# Patient Record
Sex: FEMALE | Race: WHITE | NOT HISPANIC OR LATINO | RURAL
[De-identification: names, ages, dates, MRNs, and addresses within clinical notes are randomized per-mention and may not be internally consistent; named-entity substitution may affect disease eponyms.]

---

## 2023-12-29 ENCOUNTER — OFFICE VISIT (OUTPATIENT)
Dept: FAMILY MEDICINE | Facility: CLINIC | Age: 43
End: 2023-12-29
Payer: COMMERCIAL

## 2023-12-29 VITALS
HEART RATE: 104 BPM | TEMPERATURE: 98 F | RESPIRATION RATE: 18 BRPM | DIASTOLIC BLOOD PRESSURE: 100 MMHG | HEIGHT: 67 IN | SYSTOLIC BLOOD PRESSURE: 142 MMHG | OXYGEN SATURATION: 97 % | BODY MASS INDEX: 45.2 KG/M2 | WEIGHT: 288 LBS

## 2023-12-29 DIAGNOSIS — J10.1 INFLUENZA B: ICD-10-CM

## 2023-12-29 DIAGNOSIS — Z20.828 EXPOSURE TO VIRAL DISEASE: Primary | ICD-10-CM

## 2023-12-29 LAB
CTP QC/QA: YES
CTP QC/QA: YES
FLUAV AG NPH QL: NEGATIVE
FLUBV AG NPH QL: POSITIVE
S PYO RRNA THROAT QL PROBE: NEGATIVE
SARS-COV-2 AG RESP QL IA.RAPID: NEGATIVE

## 2023-12-29 PROCEDURE — 99203 PR OFFICE/OUTPT VISIT, NEW, LEVL III, 30-44 MIN: ICD-10-PCS | Mod: ,,, | Performed by: FAMILY MEDICINE

## 2023-12-29 PROCEDURE — 87880 POCT RAPID STREP A: ICD-10-PCS | Mod: QW,,, | Performed by: FAMILY MEDICINE

## 2023-12-29 PROCEDURE — 87428 SARSCOV & INF VIR A&B AG IA: CPT | Mod: QW,,, | Performed by: FAMILY MEDICINE

## 2023-12-29 PROCEDURE — 99203 OFFICE O/P NEW LOW 30 MIN: CPT | Mod: ,,, | Performed by: FAMILY MEDICINE

## 2023-12-29 PROCEDURE — 87428 POCT SARS-COV2 (COVID) WITH FLU ANTIGEN: ICD-10-PCS | Mod: QW,,, | Performed by: FAMILY MEDICINE

## 2023-12-29 PROCEDURE — 87880 STREP A ASSAY W/OPTIC: CPT | Mod: QW,,, | Performed by: FAMILY MEDICINE

## 2023-12-29 RX ORDER — PROMETHAZINE HYDROCHLORIDE AND DEXTROMETHORPHAN HYDROBROMIDE 6.25; 15 MG/5ML; MG/5ML
5 SYRUP ORAL EVERY 4 HOURS PRN
Qty: 118 ML | Refills: 1 | Status: SHIPPED | OUTPATIENT
Start: 2023-12-29 | End: 2024-01-08

## 2023-12-29 NOTE — PROGRESS NOTES
Subjective     Patient ID: Rosalba Espitia is a 43 y.o. female.    Chief Complaint: Cough (Cough/fever/body ache/fatigue/headache/congestion symptoms started on [facility])    One episode of post-tussive vomiting.    Cough      Review of Systems   Respiratory:  Positive for cough.           Objective     Physical Exam  Constitutional:       Appearance: She is ill-appearing. She is not toxic-appearing.   HENT:      Nose: Congestion present.      Right Sinus: No maxillary sinus tenderness or frontal sinus tenderness.      Left Sinus: No maxillary sinus tenderness or frontal sinus tenderness.      Mouth/Throat:      Pharynx: No posterior oropharyngeal erythema.   Cardiovascular:      Rate and Rhythm: Normal rate and regular rhythm.   Pulmonary:      Effort: Pulmonary effort is normal.      Breath sounds: Rhonchi (very few faint rhonchi with cough) present.   Neurological:      Mental Status: She is alert.            Assessment and Plan     1. Exposure to viral disease  -     POCT SARS-COV2 (COVID) with Flu Antigen  -     POCT rapid strep A    2. Influenza B    Other orders  -     promethazine-dextromethorphan (PROMETHAZINE-DM) 6.25-15 mg/5 mL Syrp; Take 5 mLs by mouth every 4 (four) hours as needed (Cough).  Dispense: 118 mL; Refill: 1        Influenza symptoms began 5 days ago.  Tamiflu unlikely to have any benefit.  If she gets worse she will come in for a chest x-ray.         No follow-ups on file.

## 2024-04-18 ENCOUNTER — OFFICE VISIT (OUTPATIENT)
Dept: FAMILY MEDICINE | Facility: CLINIC | Age: 44
End: 2024-04-18
Payer: COMMERCIAL

## 2024-04-18 VITALS
DIASTOLIC BLOOD PRESSURE: 86 MMHG | HEART RATE: 90 BPM | TEMPERATURE: 98 F | HEIGHT: 68 IN | OXYGEN SATURATION: 99 % | WEIGHT: 293 LBS | BODY MASS INDEX: 44.41 KG/M2 | SYSTOLIC BLOOD PRESSURE: 138 MMHG

## 2024-04-18 DIAGNOSIS — R21 SKIN RASH: Primary | ICD-10-CM

## 2024-04-18 PROCEDURE — 86038 ANTINUCLEAR ANTIBODIES: CPT | Mod: ,,, | Performed by: CLINICAL MEDICAL LABORATORY

## 2024-04-18 PROCEDURE — 99213 OFFICE O/P EST LOW 20 MIN: CPT | Mod: ,,, | Performed by: NURSE PRACTITIONER

## 2024-04-18 RX ORDER — PREDNISONE 10 MG/1
TABLET ORAL
Qty: 10 TABLET | Refills: 0 | Status: SHIPPED | OUTPATIENT
Start: 2024-04-18

## 2024-04-18 RX ORDER — TRIAMCINOLONE ACETONIDE 1 MG/G
OINTMENT TOPICAL 2 TIMES DAILY PRN
Qty: 453 G | Refills: 0 | Status: SHIPPED | OUTPATIENT
Start: 2024-04-18

## 2024-04-18 NOTE — PROGRESS NOTES
"Subjective:       Patient ID: Rosalba Espitia is a 43 y.o. female.    Chief Complaint: Rash (Pt states burning started last night. Neck, chest and back area)    Presents to clinic as above. States has had the same rash several times in the past. It is not really itchy but burns some. No joint pain or myalgias. No fever.       Review of Systems   Constitutional: Negative.    Respiratory: Negative.     Cardiovascular: Negative.    Skin:  Positive for rash. Negative for itching.          Reviewed family, medical, surgical, and social history.    Objective:      /86 (BP Location: Left arm, Patient Position: Sitting, BP Method: Large (Manual))   Pulse 90   Temp 97.8 °F (36.6 °C) (Oral)   Ht 5' 8" (1.727 m)   Wt 133.4 kg (294 lb)   LMP 03/11/2024 (Within Weeks)   SpO2 99%   BMI 44.70 kg/m²   Physical Exam  Vitals and nursing note reviewed.   Constitutional:       General: She is not in acute distress.     Appearance: Normal appearance. She is obese. She is not ill-appearing, toxic-appearing or diaphoretic.   HENT:      Head: Normocephalic.      Mouth/Throat:      Mouth: Mucous membranes are moist.   Cardiovascular:      Rate and Rhythm: Normal rate and regular rhythm.      Heart sounds: Normal heart sounds.   Pulmonary:      Effort: Pulmonary effort is normal.      Breath sounds: Normal breath sounds.   Musculoskeletal:      Cervical back: Normal range of motion and neck supple.   Skin:     General: Skin is warm and dry.      Capillary Refill: Capillary refill takes less than 2 seconds.      Findings: Rash present.      Comments: Coalescing pink plaques to upper anterior chest, neck and forearms. The rash is only present to sun exposed areas. No lesions on trunk or legs. No scabbing or exudate.    Neurological:      Mental Status: She is alert and oriented to person, place, and time.   Psychiatric:         Mood and Affect: Mood normal.         Behavior: Behavior normal.         Thought Content: Thought " content normal.         Judgment: Judgment normal.            No visits with results within 1 Day(s) from this visit.   Latest known visit with results is:   Office Visit on 12/29/2023   Component Date Value Ref Range Status    SARS Coronavirus 2 Antigen 12/29/2023 Negative  Negative Final    Rapid Influenza A Ag 12/29/2023 Negative  Negative Final    Rapid Influenza B Ag 12/29/2023 Positive (A)  Negative Final     Acceptable 12/29/2023 Yes   Final    Rapid Strep A Screen 12/29/2023 Negative  Negative, Positive Slide, Positive Tube Final     Acceptable 12/29/2023 Yes   Final      Assessment:       1. Skin rash        Plan:       Skin rash  -     CHUCK EIA w/ Reflex to dsDNA/DOLORES; Future; Expected date: 04/18/2024  -     predniSONE (DELTASONE) 10 MG tablet; Take 2 po daily for 3 days. Then, 1 po daily until gone.  Dispense: 10 tablet; Refill: 0  -     triamcinolone acetonide 0.1% (KENALOG) 0.1 % ointment; Apply topically 2 (two) times daily as needed (skin rahs).  Dispense: 453 g; Refill: 0  -     Ambulatory referral/consult to Dermatology; Future; Expected date: 04/25/2024    I think this could be Cutaneous lupus erythematosus. But, I am referring to dermatology for definitive dx.   F/U with us if dermatology has not called you in 1 week.   RTC PRN          Risks, benefits, and side effects were discussed with the patient. All questions were answered to the fullest satisfaction of the patient, and pt verbalized understanding and agreement to treatment plan. Pt was to call with any new or worsening symptoms, or present to the ER.

## 2024-04-23 LAB — ANA SER QL: NEGATIVE

## 2024-04-24 ENCOUNTER — TELEPHONE (OUTPATIENT)
Dept: FAMILY MEDICINE | Facility: CLINIC | Age: 44
End: 2024-04-24
Payer: COMMERCIAL

## 2024-04-24 NOTE — TELEPHONE ENCOUNTER
Pt notified. Voiced understanding. ----- Message from DAYAMI Welch sent at 4/23/2024 12:57 PM CDT -----  Notify negative CHUCK. This is a test for lupus

## 2024-06-26 ENCOUNTER — OFFICE VISIT (OUTPATIENT)
Dept: DERMATOLOGY | Facility: CLINIC | Age: 44
End: 2024-06-26
Payer: COMMERCIAL

## 2024-06-26 DIAGNOSIS — B36.0 TINEA VERSICOLOR: ICD-10-CM

## 2024-06-26 PROCEDURE — 99204 OFFICE O/P NEW MOD 45 MIN: CPT | Mod: ,,, | Performed by: STUDENT IN AN ORGANIZED HEALTH CARE EDUCATION/TRAINING PROGRAM

## 2024-06-26 RX ORDER — KETOCONAZOLE 20 MG/ML
SHAMPOO, SUSPENSION TOPICAL
Qty: 120 ML | Refills: 5 | Status: SHIPPED | OUTPATIENT
Start: 2024-06-27

## 2024-06-26 RX ORDER — FLUCONAZOLE 150 MG/1
150 TABLET ORAL WEEKLY
Qty: 4 TABLET | Refills: 0 | Status: SHIPPED | OUTPATIENT
Start: 2024-06-26 | End: 2024-07-18

## 2024-06-26 NOTE — PROGRESS NOTES
[facility]  Solo Perales MD    [address] [place] [address], [place] [address]  [phone]    Fax: [phone]    Patient Name: Rosalba Espitia  Medical Record Number: [id number]  PCP: Avani, Primary Doctor  Age: 43 y.o. : 1980  Contact: [phone] (home) [phone] (work)    CC: rash  History of Present Illness:     Rosalba Espitia is a 43 y.o.  female with no history of skin cancer  who presents to clinic today for pruritic burning rash on the neck and chest. This has been present for over one year inconsistently. Previous treatment includes TAC 0.1 % ointment and prednisone 10 mg which did improve, but now she is flaring.      The patient has no other concerns today.    Review of Systems:     Unremarkable other than mentioned above.     Physical Exam:     General: Relaxed, oriented, alert    Skin examination of the scalp, face, neck, chest, back, abdomen, upper extremities and lower extremities were normal except for as listed below      Assessment and Plan:     1. Tinea Versicolor   - pink/hyperpigmented patches with fine scale    Plan:   Ketoconazole shampoo daily as needed. Must be left on for 5-10 minutes before washing off  - Fluconazole 150 mg weekly x 4 doses   Counseling  shampoos with zinc pirithyone, selenium or ketoconazole can resolve most cases. Pulse oral antifungal therapy can treat widespread skin disease.  Tinea Versicolor is a yeast infection of the skin. It occurs in young people, and is easily treatable, but can recur.              Solo Perales MD   Mohs Surgery/Dermatologic Oncology  Dermatology